# Patient Record
Sex: MALE | Race: WHITE | Employment: FULL TIME | URBAN - METROPOLITAN AREA
[De-identification: names, ages, dates, MRNs, and addresses within clinical notes are randomized per-mention and may not be internally consistent; named-entity substitution may affect disease eponyms.]

---

## 2020-02-29 ENCOUNTER — HOSPITAL ENCOUNTER (INPATIENT)
Age: 45
LOS: 1 days | Discharge: HOME | DRG: 301 | End: 2020-02-29
Attending: INTERNAL MEDICINE | Admitting: INTERNAL MEDICINE

## 2020-02-29 VITALS
RESPIRATION RATE: 20 BRPM | WEIGHT: 310.41 LBS | SYSTOLIC BLOOD PRESSURE: 122 MMHG | BODY MASS INDEX: 41.14 KG/M2 | TEMPERATURE: 98.4 F | HEART RATE: 66 BPM | OXYGEN SATURATION: 96 % | HEIGHT: 73 IN | DIASTOLIC BLOOD PRESSURE: 63 MMHG

## 2020-02-29 PROBLEM — F17.200 SMOKING: Status: ACTIVE | Noted: 2020-02-29

## 2020-02-29 PROBLEM — I82.412 ACUTE DEEP VEIN THROMBOSIS (DVT) OF FEMORAL VEIN OF LEFT LOWER EXTREMITY (HCC): Status: ACTIVE | Noted: 2020-02-29

## 2020-02-29 PROBLEM — F11.20 METHADONE MAINTENANCE THERAPY PATIENT (HCC): Status: ACTIVE | Noted: 2020-02-29

## 2020-02-29 PROBLEM — E66.9 OBESITY (BMI 35.0-39.9 WITHOUT COMORBIDITY): Status: ACTIVE | Noted: 2020-02-29

## 2020-02-29 LAB
BUN BLDV-MCNC: 17 MG/DL (ref 6–20)
CREAT SERPL-MCNC: 0.78 MG/DL (ref 0.7–1.2)
GFR AFRICAN AMERICAN: >60 ML/MIN
GFR NON-AFRICAN AMERICAN: >60 ML/MIN
GFR SERPL CREATININE-BSD FRML MDRD: NORMAL ML/MIN/{1.73_M2}
GFR SERPL CREATININE-BSD FRML MDRD: NORMAL ML/MIN/{1.73_M2}
GLUCOSE BLD-MCNC: 107 MG/DL (ref 75–110)
GLUCOSE BLD-MCNC: 111 MG/DL (ref 75–110)
GLUCOSE BLD-MCNC: 115 MG/DL (ref 75–110)
HCT VFR BLD CALC: 28.1 % (ref 40.7–50.3)
HCT VFR BLD CALC: 28.5 % (ref 40.7–50.3)
HEMOGLOBIN: 8.4 G/DL (ref 13–17)
HEMOGLOBIN: 8.5 G/DL (ref 13–17)
MCH RBC QN AUTO: 30 PG (ref 25.2–33.5)
MCHC RBC AUTO-ENTMCNC: 29.8 G/DL (ref 28.4–34.8)
MCV RBC AUTO: 100.7 FL (ref 82.6–102.9)
NRBC AUTOMATED: 0 PER 100 WBC
PARTIAL THROMBOPLASTIN TIME: 27.2 SEC (ref 20.5–30.5)
PDW BLD-RTO: 16.2 % (ref 11.8–14.4)
PLATELET # BLD: 98 K/UL (ref 138–453)
PMV BLD AUTO: 9.7 FL (ref 8.1–13.5)
RBC # BLD: 2.83 M/UL (ref 4.21–5.77)
WBC # BLD: 11 K/UL (ref 3.5–11.3)

## 2020-02-29 PROCEDURE — 82565 ASSAY OF CREATININE: CPT

## 2020-02-29 PROCEDURE — 85018 HEMOGLOBIN: CPT

## 2020-02-29 PROCEDURE — 85730 THROMBOPLASTIN TIME PARTIAL: CPT

## 2020-02-29 PROCEDURE — 6360000002 HC RX W HCPCS: Performed by: NURSE PRACTITIONER

## 2020-02-29 PROCEDURE — 36415 COLL VENOUS BLD VENIPUNCTURE: CPT

## 2020-02-29 PROCEDURE — 2060000000 HC ICU INTERMEDIATE R&B

## 2020-02-29 PROCEDURE — 99222 1ST HOSP IP/OBS MODERATE 55: CPT | Performed by: INTERNAL MEDICINE

## 2020-02-29 PROCEDURE — 82947 ASSAY GLUCOSE BLOOD QUANT: CPT

## 2020-02-29 PROCEDURE — 85027 COMPLETE CBC AUTOMATED: CPT

## 2020-02-29 PROCEDURE — 6370000000 HC RX 637 (ALT 250 FOR IP): Performed by: INTERNAL MEDICINE

## 2020-02-29 PROCEDURE — 85014 HEMATOCRIT: CPT

## 2020-02-29 PROCEDURE — 84520 ASSAY OF UREA NITROGEN: CPT

## 2020-02-29 RX ORDER — HEPARIN SODIUM 1000 [USP'U]/ML
5000 INJECTION, SOLUTION INTRAVENOUS; SUBCUTANEOUS PRN
Status: DISCONTINUED | OUTPATIENT
Start: 2020-02-29 | End: 2020-02-29 | Stop reason: HOSPADM

## 2020-02-29 RX ORDER — ONDANSETRON 2 MG/ML
4 INJECTION INTRAMUSCULAR; INTRAVENOUS EVERY 6 HOURS PRN
Status: DISCONTINUED | OUTPATIENT
Start: 2020-02-29 | End: 2020-02-29 | Stop reason: HOSPADM

## 2020-02-29 RX ORDER — POTASSIUM CHLORIDE 7.45 MG/ML
10 INJECTION INTRAVENOUS PRN
Status: DISCONTINUED | OUTPATIENT
Start: 2020-02-29 | End: 2020-02-29 | Stop reason: HOSPADM

## 2020-02-29 RX ORDER — SODIUM CHLORIDE 0.9 % (FLUSH) 0.9 %
10 SYRINGE (ML) INJECTION PRN
Status: DISCONTINUED | OUTPATIENT
Start: 2020-02-29 | End: 2020-02-29 | Stop reason: HOSPADM

## 2020-02-29 RX ORDER — SODIUM CHLORIDE 0.9 % (FLUSH) 0.9 %
10 SYRINGE (ML) INJECTION EVERY 12 HOURS SCHEDULED
Status: DISCONTINUED | OUTPATIENT
Start: 2020-02-29 | End: 2020-02-29 | Stop reason: HOSPADM

## 2020-02-29 RX ORDER — ACETAMINOPHEN 325 MG/1
650 TABLET ORAL EVERY 6 HOURS PRN
Status: DISCONTINUED | OUTPATIENT
Start: 2020-02-29 | End: 2020-02-29 | Stop reason: HOSPADM

## 2020-02-29 RX ORDER — PROMETHAZINE HYDROCHLORIDE 25 MG/1
12.5 TABLET ORAL EVERY 6 HOURS PRN
Status: DISCONTINUED | OUTPATIENT
Start: 2020-02-29 | End: 2020-02-29 | Stop reason: HOSPADM

## 2020-02-29 RX ORDER — NICOTINE 21 MG/24HR
1 PATCH, TRANSDERMAL 24 HOURS TRANSDERMAL DAILY PRN
Status: DISCONTINUED | OUTPATIENT
Start: 2020-02-29 | End: 2020-02-29 | Stop reason: HOSPADM

## 2020-02-29 RX ORDER — METHADONE HYDROCHLORIDE 10 MG/1
72 TABLET ORAL DAILY
Status: DISCONTINUED | OUTPATIENT
Start: 2020-02-29 | End: 2020-02-29 | Stop reason: HOSPADM

## 2020-02-29 RX ORDER — METHADONE HYDROCHLORIDE 10 MG/1
72 TABLET ORAL DAILY
COMMUNITY

## 2020-02-29 RX ORDER — ACETAMINOPHEN 650 MG/1
650 SUPPOSITORY RECTAL EVERY 6 HOURS PRN
Status: DISCONTINUED | OUTPATIENT
Start: 2020-02-29 | End: 2020-02-29 | Stop reason: HOSPADM

## 2020-02-29 RX ORDER — MAGNESIUM SULFATE 1 G/100ML
1 INJECTION INTRAVENOUS PRN
Status: DISCONTINUED | OUTPATIENT
Start: 2020-02-29 | End: 2020-02-29 | Stop reason: HOSPADM

## 2020-02-29 RX ORDER — POTASSIUM CHLORIDE 20 MEQ/1
40 TABLET, EXTENDED RELEASE ORAL PRN
Status: DISCONTINUED | OUTPATIENT
Start: 2020-02-29 | End: 2020-02-29 | Stop reason: HOSPADM

## 2020-02-29 RX ORDER — POLYETHYLENE GLYCOL 3350 17 G/17G
17 POWDER, FOR SOLUTION ORAL DAILY PRN
Status: DISCONTINUED | OUTPATIENT
Start: 2020-02-29 | End: 2020-02-29 | Stop reason: HOSPADM

## 2020-02-29 RX ORDER — HEPARIN SODIUM 1000 [USP'U]/ML
10000 INJECTION, SOLUTION INTRAVENOUS; SUBCUTANEOUS PRN
Status: DISCONTINUED | OUTPATIENT
Start: 2020-02-29 | End: 2020-02-29 | Stop reason: HOSPADM

## 2020-02-29 RX ORDER — HEPARIN SODIUM 10000 [USP'U]/100ML
14.9 INJECTION, SOLUTION INTRAVENOUS CONTINUOUS
Status: DISCONTINUED | OUTPATIENT
Start: 2020-02-29 | End: 2020-02-29

## 2020-02-29 RX ADMIN — HEPARIN SODIUM AND DEXTROSE 14.9 UNITS/KG/HR: 10000; 5 INJECTION INTRAVENOUS at 04:43

## 2020-02-29 RX ADMIN — RIVAROXABAN 15 MG: 15 TABLET, FILM COATED ORAL at 17:07

## 2020-02-29 RX ADMIN — HEPARIN SODIUM 5000 UNITS: 1000 INJECTION INTRAVENOUS; SUBCUTANEOUS at 07:49

## 2020-02-29 ASSESSMENT — PAIN SCALES - GENERAL
PAINLEVEL_OUTOF10: 0

## 2020-02-29 NOTE — PROGRESS NOTES
Discharge instructions given to patient and family. All questions clarified. Patient ambulatory and refused discharge via wheelchair.

## 2020-02-29 NOTE — PROGRESS NOTES
In-house NP notified of patient's arrival to room 2004. No pain at this time. Waiting for pharmacy to enter heparin drip information to restart drip. No new orders at this time.

## 2020-02-29 NOTE — PROGRESS NOTES
Physical Therapy   DATE: 2020    NAME: Tyrell Harding  MRN: [id number]   : 1975    Patient not seen this date for Physical Therapy due to:  [] Blood transfusion in progress  [] Hemodialysis  []  Patient Declined  [] Spine Precautions   [] Strict Bedrest  [] Surgery/ Procedure  [] Testing      [x] Other: Patient has DVT. Had Heparin injection today 2:34VF. Per policy, will hold 24 -48 hours before proceeding with eval.        [] PT being discontinued at this time. Patient independent. No further needs. [] PT being discontinued at this time as the patient has been transferred to palliative care. No further needs.     Saúl Agosto, PT

## 2020-02-29 NOTE — PROGRESS NOTES
Patient arrived to room 2004 via stretcher. Belongings with patient throughout transfer to bed. Patient states he is in no pain at this time. Vitals and assessment completed and recorded.  In-house NP notified of arrival.

## 2020-02-29 NOTE — H&P
[facility]    HISTORY AND PHYSICAL EXAMINATION            Date:   2/29/2020  Patient name:  Bishop Tyler  Date of admission:  2/29/2020  3:51 AM  MRN:   [id number]  Account:  [de-identified]  YOB: 1975  PCP:    No primary care provider on file. Room:   2004/2004-01  Code Status:    Full Code    Chief Complaint:   Left LE swelling. History Obtained From:     patient, electronic medical record    History of Present Illness:   61-year-old patient presented to [facility] with complaints of left lower extremity swelling progressively getting worse in the last 1 month along with aching. States he noticed the swelling started a month ago but significantly worse yesterday which prompted the ED visit. Patient denies any recent hospitalization, surgery, travel. No personal history of prior blood clots. No prior h/o nose bleeds, melena/hameatochezia [place]. Current smoker more than 25-pack-year history. Denies alcohol or illicit drug abuse. He works as . +family h/o DVT/PE in sister when she was diagnosed with cancer. 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 He was started on heparin weight-based protocol and transferred here for vascular surgery evaluation. PMH: prediabetes, chronic LBP with DJD, opioid drug abuse on methadone. Does not have a PCP. Past Medical History:     Past Medical History:   Diagnosis Date    Degenerative disc disease, lumbar         Past Surgical History:     History reviewed. No pertinent surgical history. Medications Prior to Admission:     Prior to Admission medications    Medication Sig Start Date End Date Taking? Authorizing Provider   methadone (DOLOPHINE) 10 MG tablet Take 72 mg by mouth daily. Yes Historical Provider, MD   rivaroxaban 15 & 20 MG Starter Pack Take as directed on package.  2/29/20  Yes Hortencia Ramos MD   Elastic Bandages & Supports (MEDICAL COMPRESSION THIGH HIGH) MISC Use as tolerated when walking 20  Yes Francena Leyden, MD        Allergies:     Patient has no known allergies. Social History:     Tobacco:    reports that he has been smoking. He has a 12.50 pack-year smoking history. He has never used smokeless tobacco.  Alcohol:      reports previous alcohol use. Drug Use:  reports previous drug use. Family History:     Family History   Problem Relation Age of Onset    Diabetes Mother    Iliana Lu Cancer Sister     Diabetes Sister        Review of Systems:     Positive and Negative as described in HPI.     CONSTITUTIONAL:  negative for fevers, chills, sweats, fatigue, weight loss  HEENT:  negative for vision, hearing changes, runny nose, throat pain  RESPIRATORY:  negative for shortness of breath, cough, congestion, wheezing  CARDIOVASCULAR:  negative for chest pain, palpitations  GASTROINTESTINAL:  negative for nausea, vomiting, diarrhea, constipation, change in bowel habits, abdominal pain   GENITOURINARY:  negative for difficulty of urination, burning with urination, frequency   INTEGUMENT:  negative for rash, skin lesions, easy bruising   HEMATOLOGIC/LYMPHATIC:  +left LE swelling/edema   MUSCULOSKELETAL:  negative joint pains, muscle aches, swelling of joints  NEUROLOGICAL:  negative for headaches, dizziness, lightheadedness, numbness, pain, tingling extremities  BEHAVIOR/PSYCH:  negative for depression, anxiety    Physical Exam:   /66   Pulse 68   Temp 98.1 °F (36.7 °C) (Oral)   Resp 20   Ht 6' 1\" (1.854 m)   Wt (!) 310 lb 6.5 oz (140.8 kg)   SpO2 95%   BMI 40.95 kg/m²   Temp (24hrs), Av.4 °F (36.9 °C), Min:98.1 °F (36.7 °C), Max:98.8 °F (37.1 °C)    Recent Labs     20  0447 20  1232   POCGLU 115* 111*       Intake/Output Summary (Last 24 hours) at 2020 1448  Last data filed at 2020 1237  Gross per 24 hour   Intake 227 ml   Output 1000 ml   Net -773 ml       General Appearance: alert, well appearing, and in no acute distress, obese body habitus, was sleeping in bed, in no distress. Off nc o2.    Mental status: oriented to person, place, and time  Head: normocephalic, atraumatic  Eye: no icterus, redness, pupils equal and reactive, extraocular eye movements intact, conjunctiva clear  Ear: normal external ear, no discharge, hearing intact  Nose: no drainage noted  Mouth: mucous membranes moist  Neck: supple, no carotid bruits, thyroid not palpable  Lungs: Bilateral equal air entry, clear to ausculation, no wheezing, rales or rhonchi, normal effort  Cardiovascular: normal rate, regular rhythm, no murmur, gallop, rub  Abdomen: Soft, nontender, nondistended, normal bowel sounds, no hepatomegaly or splenomegaly  Neurologic: There are no new focal motor or sensory deficits, normal muscle tone and bulk,  normal speech, cranial nerves II through XII grossly intact  Skin: No gross lesions, rashes, bruising or bleeding on exposed skin area  Extremities: peripheral pulses palpable, no pedal edema or calf pain with palpation    Investigations:      Laboratory Testing:  Recent Results (from the past 24 hour(s))   POC Glucose Fingerstick    Collection Time: 02/29/20  4:47 AM   Result Value Ref Range    POC Glucose 115 (H) 75 - 110 mg/dL   APTT    Collection Time: 02/29/20  5:56 AM   Result Value Ref Range    PTT 27.2 20.5 - 30.5 sec   CBC    Collection Time: 02/29/20  5:56 AM   Result Value Ref Range    WBC 11.0 3.5 - 11.3 k/uL    RBC 2.83 (L) 4.21 - 5.77 m/uL    Hemoglobin 8.5 (L) 13.0 - 17.0 g/dL    Hematocrit 28.5 (L) 40.7 - 50.3 %    .7 82.6 - 102.9 fL    MCH 30.0 25.2 - 33.5 pg    MCHC 29.8 28.4 - 34.8 g/dL    RDW 16.2 (H) 11.8 - 14.4 %    Platelets 98 (L) 678 - 453 k/uL    MPV 9.7 8.1 - 13.5 fL    NRBC Automated 0.0 0.0 per 100 WBC   POC Glucose Fingerstick    Collection Time: 02/29/20 12:32 PM   Result Value Ref Range    POC Glucose 111 (H) 75 - 110 mg/dL       Assessment :

## 2020-02-29 NOTE — DISCHARGE SUMMARY
Kathia Anderson [facility]    Discharge Summary     Patient ID: Michelet Ward  :  1975   MRN: [id number]     ACCOUNT:  [de-identified]   Admit Date: 2020   Discharge Date: 2020 Length of Stay: 0  Code Status:  Full Code  Admitting Physician: Nahomi Perez MD  Discharge Physician: Nahomi Perez MD     Active Discharge Diagnoses:     Hospital Problem Lists:  Active Problems:    Acute deep vein thrombosis (DVT) of femoral vein of left lower extremity (HCC)    Smoking    Methadone maintenance therapy patient ([address]    Obesity (BMI 35.0-39.9 without comorbidity)  Resolved Problems:    * No resolved hospital problems. *      Admission Condition:  good     Discharged Condition: good    Hospital Stay:     Hospital Course: 60-year-old patient presented to [facility] with complaints of left lower extremity swelling progressively getting worse in the last 1 month along with aching. 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 He was started on heparin weight-based protocol and transferred here for vascular surgery evaluation. PMH: prediabetes, chronic LBP with DJD, opioid drug abuse on methadone. Does not have a PCP. - Was evaluated by vascular sx, switched to Po anticoagulation which he will need for 6mo along with compression stockings and lifestyle modifications. Radiology:  No results found. Consultations:    Consults:     Final Specialist Recommendations/Findings:   IP CONSULT TO VASCULAR SURGERY      The patient was seen and examined on day of discharge and this discharge summary is in conjunction with any daily progress note from day of discharge.     Discharge plan:     Disposition: Home    Physician Follow Up:     call [phone] to find PCP in your area        Santa Stockton MD  [address] [facility] [address] [facility] [address] [facility]  [phone]    Schedule an appointment as soon as possible for a visit

## 2020-02-29 NOTE — PROGRESS NOTES
[facility])  Occupational Therapy Not Seen Note    DATE: 2020  Name: Chris Estrada  : 1975  MRN: [id number]    Patient not available for Occupational Therapy due to:    [] Testing:    [] Hemodialysis    [] Blood Transfusion in Progress    []Refusal by Patient:    [] Surgery/Procedure:    [] Strict Bedrest    [] Sedation    [] Spine Precautions     [] Pt being transferred to palliative care at this time. Spoke with pt/family and OT services to be defered. [] Pt independent with functional mobility and functional tasks. Pt with no OT acute care needs at this time, will defer OT eval.    [x] Other: Pt with DVT, heparin administered at 749AM 2020.  Per therapy protocol, await therapeutic window 24-48 hours before attempting eval.     Next Scheduled Treatment: check 3/1/2020    Agnieszka Tillman OTR/L

## 2020-02-29 NOTE — PROGRESS NOTES
NP notified of patient being on methadone program in [facility], [address], at [place]. He is prescribed 72mg every morning. Medication added to home med list.     NP also notified of patient unsure if he is diabetic or not. Blood sugar 115. New order for poc blood glucose checks. NP states they will have to call [place] in the morning to confirm methadone dose.

## 2020-02-29 NOTE — PLAN OF CARE
Problem: Safety:  Goal: Free from accidental physical injury  Description  Free from accidental physical injury  Outcome: Ongoing  Goal: Free from intentional harm  Description  Free from intentional harm  Outcome: Ongoing  Goal: Ability to remain free from injury will improve  Description  Ability to remain free from injury will improve  Outcome: Ongoing     Problem: Daily Care:  Goal: Daily care needs are met  Description  Daily care needs are met  Outcome: Ongoing     Problem: Pain:  Goal: Patient's pain/discomfort is manageable  Description  Patient's pain/discomfort is manageable  Outcome: Ongoing     Problem: Physical Regulation:  Goal: Complications related to the disease process, condition or treatment will be avoided or minimized  Description  Complications related to the disease process, condition or treatment will be avoided or minimized  Outcome: Ongoing

## 2020-02-29 NOTE — PROGRESS NOTES
CLINICAL PHARMACY NOTE: [facility] [address] [facility] [address] [email or web address] [address] Select Patient?: No  Total # of Prescriptions Filled: 1   The following medications were delivered to the patient:  · xarelto starter pack  Total # of Interventions Completed: 0  Time Spent (min): 5    Additional Documentation: med delivered to patient 2- at 2:48pm

## 2020-03-31 ENCOUNTER — HOSPITAL ENCOUNTER (EMERGENCY)
Age: 45
Discharge: HOME | End: 2020-03-31
Attending: EMERGENCY MEDICINE

## 2020-03-31 VITALS
HEIGHT: 73 IN | RESPIRATION RATE: 16 BRPM | HEART RATE: 80 BPM | BODY MASS INDEX: 40.29 KG/M2 | WEIGHT: 304 LBS | OXYGEN SATURATION: 97 % | TEMPERATURE: 97.5 F | SYSTOLIC BLOOD PRESSURE: 159 MMHG | DIASTOLIC BLOOD PRESSURE: 92 MMHG

## 2020-03-31 PROCEDURE — 99281 EMR DPT VST MAYX REQ PHY/QHP: CPT

## 2020-03-31 ASSESSMENT — ENCOUNTER SYMPTOMS
ABDOMINAL PAIN: 0
COLOR CHANGE: 0
NAUSEA: 0
VOMITING: 0
COUGH: 0
WHEEZING: 0

## 2020-03-31 NOTE — ED PROVIDER NOTES
[facility] ED  Emergency Department Encounter  Advanced Practice Provider     Pt Name: Andrew Samaniego  MRN: [id number]  [place] 1975  Date of evaluation: 3/31/20  PCP:  No primary care provider on file. CHIEF COMPLAINT       No chief complaint on file. HISTORY OF PRESENT ILLNESS  (Location/Symptom, Timing/Onset,Context/Setting, Quality, Duration, Modifying Factors, Severity.)      Andrew Samaniego is a 41 y.o. male who presents with request for medication refill. Patient was prescribed Xarelto 1 month ago after being diagnosed with a DVT. It sounds as if this was an unprovoked clot however patient was told that it may have been due to the way he was sitting on his stool at work. Patient states that they provided him with a one-month supply. He reports that the new primary care physician that he has scheduled with has canceled twice and his next appointment is not for a month and a half. His last dose was 2 days ago. He has no previous history of DVT or PE. No shortness of breath. He states that the pain in his leg is actually getting better. PAST MEDICAL /SURGICAL / SOCIAL / FAMILY HISTORY      has a past medical history of Degenerative disc disease, lumbar. DVT    No past surgical history    Social History     Socioeconomic History    Marital status:      Spouse name: Not on file    Number of children: Not on file    Years of education: Not on file    Highest education level: Not on file   Occupational History    Occupation:      Employer: NOT EMPLOYED     Comment: [employer]   Social Needs    Financial resource strain: Not on file    Food insecurity     Worry: Not on file     Inability: Not on file   [employer] needs     Medical: Not on file     Non-medical: Not on file   Tobacco Use    Smoking status: Current Every Day Smoker     Packs/day: 0.50     Years: 25.00     Pack years: 12.50    Smokeless tobacco: Never Used   Substance and Sexual Activity    Alcohol use: Not Currently    Drug use: Not Currently    Sexual activity: Not on file   Lifestyle    Physical activity     Days per week: Not on file     Minutes per session: Not on file    Stress: Not on file   Relationships    Social connections     Talks on phone: Not on file     Gets together: Not on file     Attends Latter-day service: Not on file     Active member of club or organization: Not on file     Attends meetings of clubs or organizations: Not on file     Relationship status: Not on file    Intimate partner violence     Fear of current or ex partner: Not on file     Emotionally abused: Not on file     Physically abused: Not on file     Forced sexual activity: Not on file   Other Topics Concern    Not on file   Social History Narrative    Not on file       Family History   Problem Relation Age of Onset    Diabetes Mother     Cancer Sister     Diabetes Sister        Allergies:  Patient has no known allergies. Home Medications:  Prior to Admission medications    Medication Sig Start Date End Date Taking? Authorizing Provider   rivaroxaban (XARELTO) 20 MG TABS tablet Take 1 tablet by mouth daily (with breakfast) 3/31/20  Yes Patti Olson PA-C   rivaroxaban 15 & 20 MG Starter Pack Take as directed on package. 2/29/20  Yes Marilyn Parish MD   methadone (DOLOPHINE) 10 MG tablet Take 72 mg by mouth daily. Historical Provider, MD   Elastic Bandages & Supports (MEDICAL COMPRESSION THIGH HIGH) MISC Use as tolerated when walking 2/29/20   Marilyn Parish MD       patient's medication list has been reviewed as entered by the nursing staff. REVIEW OF SYSTEMS    (2-9 systems for level 4, 10 or more for level 5)      Review of Systems   Constitutional: Negative for chills and fever. Respiratory: Negative for cough and wheezing. Cardiovascular: Negative for chest pain. Gastrointestinal: Negative for abdominal pain, nausea and vomiting.    Musculoskeletal: Negative for arthralgias and myalgias. Skin: Negative for color change and wound. PHYSICAL EXAM  (up to 7 for level 4, 8 or more for level 5)      INITIAL VITALS:  height is 6' 1\" (1.854 m) and weight is 304 lb (137.9 kg) (abnormal). His oral temperature is 97.5 °F (36.4 °C). His blood pressure is 159/92 (abnormal) and his pulse is 80. His respiration is 16 and oxygen saturation is 97%. Physical Exam  Constitutional:       Appearance: He is well-developed. He is not diaphoretic. HENT:      Head: Normocephalic and atraumatic. Right Ear: External ear normal.      Left Ear: External ear normal.   Eyes:      General: No scleral icterus. Right eye: No discharge. Left eye: No discharge. Neck:      Trachea: No tracheal deviation. Pulmonary:      Effort: Pulmonary effort is normal. No respiratory distress. Breath sounds: No stridor. Musculoskeletal: Normal range of motion. Skin:     General: Skin is warm and dry. Neurological:      Mental Status: He is alert and oriented to person, place, and time. Coordination: Coordination normal.   Psychiatric:         Behavior: Behavior normal.           PLAN (LABS / IMAGING / EKG):  No orders of the defined types were placed in this encounter. MEDICATIONS ORDERED:  Orders Placed This Encounter   Medications    rivaroxaban (XARELTO) 20 MG TABS tablet     Sig: Take 1 tablet by mouth daily (with breakfast)     Dispense:  30 tablet     Refill:  0       Controlled Substances Monitoring:      DIAGNOSTIC RESULTS / EMERGENCY DEPARTMENT COURSE / MDM     Patient does not have any insurance to cover the cost of his Xarelto. Last month he used the free month card, the only prescription card that we have would be $10 a month but you must have commercial or private health insurance which she does not currently have.   The  will cover a month of his prescription but unfortunately after that he will need to find a way to cover this

## 2020-03-31 NOTE — ED NOTES
pATIENT STATES HAs a blood clot in his left leg, states was put on xarelto and his meds ran out and 's office cancelled on him 2 x's. Patient states last time he took the medication was Sunday, patient supposed to be taking for 6 months.  Patient denies any pain or discomfort at this time     Steve Lagunas RN  03/31/20 [id number]

## 2020-03-31 NOTE — ED NOTES
spoke with Minerva Schleicher; will approve Xarelto for one month but nothing after that. Spoke with patient at bedside. He stated that he is currently not working due to [place] 19 pandemic and plans to apply for Medicaid. He stated that every time he gets online to complete the application it is full and he can not complete it.  explained to him that medications will be filled for one month but that is all. Patient understanding and stated that he will continuiously try to apply.  faxed [employer] paperwork and script to pharmacy.        Manuel Crow, MSW, LSW     Art Ards  03/31/20 [id number]

## 2020-10-01 NOTE — CARE COORDINATION
COVID ORDER Case Management Initial Discharge Plan  Chele Coburn Worful,             Met with:patient to discuss discharge plans. Information verified: address, contacts, phone number, , insurance Yes  PCP: No primary care provider on file. Date of last visit:     Insurance Provider: none    Discharge Planning    Living Arrangements:  Friends   Support Systems:  Parent, Friends/Neighbors    Home has 1 stories  0 stairs to climb to get into front door,   Location of bedroom/bathroom in home main    Patient able to perform ADL's:Independent    Current Services (outpatient & in home) none  DME equipment: none  DME provider: none      Potential Assistance Needed:  Prescription Assistance    Patient agreeable to home care: No  [place] of choice provided:  no    Prior SNF/Rehab Placement and Facility: no  Agreeable to SNF/Rehab: No  [place] of choice provided: no   Evaluation: no    Expected Discharge date:     Patient expects to be discharged to:  home  Follow Up Appointment: Best Day/ Time: Friday AM    Transportation provider: friends  Transportation arrangements needed for discharge: No    Readmission Risk              Risk of Unplanned Readmission:        6             Does patient have a readmission risk score greater than 14?: No  If yes, follow-up appointment must be made within 7 days of discharge.      Goals of Care:       Discharge Plan: home, given number for HELP, instructed to call -SAMEDAY to find PCP, med assist voucher faxed to [facility] pharmacy          Electronically signed by Sam Corona RN on 20 at 12:27 PM